# Patient Record
Sex: MALE | Race: WHITE | NOT HISPANIC OR LATINO
[De-identification: names, ages, dates, MRNs, and addresses within clinical notes are randomized per-mention and may not be internally consistent; named-entity substitution may affect disease eponyms.]

---

## 2022-02-03 PROBLEM — Z00.00 ENCOUNTER FOR PREVENTIVE HEALTH EXAMINATION: Status: ACTIVE | Noted: 2022-02-03

## 2022-02-11 ENCOUNTER — TRANSCRIPTION ENCOUNTER (OUTPATIENT)
Age: 27
End: 2022-02-11

## 2022-02-11 ENCOUNTER — APPOINTMENT (OUTPATIENT)
Dept: INTERNAL MEDICINE | Facility: CLINIC | Age: 27
End: 2022-02-11
Payer: MEDICAID

## 2022-02-11 ENCOUNTER — NON-APPOINTMENT (OUTPATIENT)
Age: 27
End: 2022-02-11

## 2022-02-11 VITALS
HEART RATE: 84 BPM | SYSTOLIC BLOOD PRESSURE: 130 MMHG | DIASTOLIC BLOOD PRESSURE: 87 MMHG | TEMPERATURE: 97.7 F | BODY MASS INDEX: 31.5 KG/M2 | OXYGEN SATURATION: 99 % | WEIGHT: 220 LBS | HEIGHT: 70 IN

## 2022-02-11 DIAGNOSIS — Z87.11 PERSONAL HISTORY OF PEPTIC ULCER DISEASE: ICD-10-CM

## 2022-02-11 DIAGNOSIS — J34.2 DEVIATED NASAL SEPTUM: ICD-10-CM

## 2022-02-11 DIAGNOSIS — E66.9 OBESITY, UNSPECIFIED: ICD-10-CM

## 2022-02-11 DIAGNOSIS — R30.0 DYSURIA: ICD-10-CM

## 2022-02-11 DIAGNOSIS — Z13.220 ENCOUNTER FOR SCREENING FOR LIPOID DISORDERS: ICD-10-CM

## 2022-02-11 DIAGNOSIS — R74.01 ELEVATION OF LEVELS OF LIVER TRANSAMINASE LEVELS: ICD-10-CM

## 2022-02-11 DIAGNOSIS — R35.0 FREQUENCY OF MICTURITION: ICD-10-CM

## 2022-02-11 DIAGNOSIS — N50.811 RIGHT TESTICULAR PAIN: ICD-10-CM

## 2022-02-11 DIAGNOSIS — Z87.898 PERSONAL HISTORY OF OTHER SPECIFIED CONDITIONS: ICD-10-CM

## 2022-02-11 DIAGNOSIS — J32.9 CHRONIC SINUSITIS, UNSPECIFIED: ICD-10-CM

## 2022-02-11 DIAGNOSIS — Z83.438 FAMILY HISTORY OF OTHER DISORDER OF LIPOPROTEIN METABOLISM AND OTHER LIPIDEMIA: ICD-10-CM

## 2022-02-11 PROCEDURE — 99385 PREV VISIT NEW AGE 18-39: CPT | Mod: 25

## 2022-02-11 PROCEDURE — 99213 OFFICE O/P EST LOW 20 MIN: CPT | Mod: GC,25

## 2022-02-11 NOTE — ASSESSMENT
[FreeTextEntry1] : 25 y/o with no PMH presenting to establish care but also with urinary symptoms. \par \par #Dysuria\par #Right testicular pain \par #r/o epididymides \par Few weeks of dysuria, urinary frequency and R testicular tenderness. No new sexual partners - recently negative for STI workup. On exam pt had mild epididymides tenderness. R testicular fullness without tenderness. No rashes or discharge noted. Per pt, he's always had R testicular fullness. No signs of hernia on exam.\par - U/A with culture and Urine Chlamydia/GC obtained\par - Consider abx treatment depending on studies \par \par #Hyperlipidemia \par History of hyperlipidemia per Patient. BMI >30 \par - Lipid panel obtained\par \par #Urinary frequency \par Plan as above. Will also obtain A1C to r/o diabetes in setting of polyuria. \par \par #Elevated ALT\par Reports he had an isolated elevated ALT at some point and was told to recheck in 6 months. \par - CMP obtained today \par \par #PUD \par History of ulcer disease - was told he had "mini ulcers" in 2020 after an Endoscopy. No symptoms at this time.\par - GI referral given due to change in insurance\par \par #Chronic Sinusitis \par - ENT referral given \par \par #HCM\par Recently finished Hep B series \par UTD with Tdap and MMR (will send records) \par HPV (got both doses when 14) \par COVID triple vaccinated\par Flu vaccinated

## 2022-02-11 NOTE — END OF VISIT
[] : Resident [FreeTextEntry3] :  26 year old presenting to [facility] care. Reports recent dysuria and urinary frequency. Some discomfort, swelling in R testicle, though notes R testicle is usually larger than L. Full STI testing was performed 11/2021 including GC/chlamydia, not sexually active since then. On exam. well appearing, pleasant, NAD. : normal appearing circumcised penis, no lesions or discharge. Mild R scrotal fullness compared to L. Mild tenderness to palpation R posterior testis, no obvious hernia w/ valsalva. no masses. \par -Dysuria/testicular discomfort. Possible epididymitis. Urine culture, GC/chlamydia. Pt defers empiric abx, prefers to await urine results. Of note, pt felt faint during exam, will defer blood work for another day.

## 2022-02-11 NOTE — PHYSICAL EXAM
[Normal Outer Ear/Nose] : the outer ears and nose were normal in appearance [Normal Oropharynx] : the oropharynx was normal [Normal TMs] : both tympanic membranes were normal [Urethral Meatus] : meatus normal [Penis Abnormality] : normal circumcised penis [Normal] : affect was normal and insight and judgment were intact [de-identified] : Deviated septum  [FreeTextEntry1] : Mild epididymides tenderness. R testicular fullness without tenderness. No rashes or discharge noted.

## 2022-02-11 NOTE — HISTORY OF PRESENT ILLNESS
[FreeTextEntry1] : Urinary symptoms  [de-identified] : 25 y/o with PMH of PUD, chronic sinusitis, presenting to establish care but also with urinary symptoms concerning for UTI. Symptoms started a few weeks ago and were more severe at that time. Now symptoms are intermittent and less severe but still present. Symptoms include burning sensation while urinating and increased frequency. Patient also reports R testicular pain which started around the same time. No penile discharge, ulcers, rashes or hematuria. No fevers/chills. No concern for STD - tested in November and negative. No new partners since then. No history of UTI. \par \par Oct 2020 Patient had an Endoscopy - found to have "mini ulcers". Unclear etiology - believes stress included. Was on Sucralfate for 6 months then Protonix 40 mg. Symptoms have resolved since then. Requesting new GI referral due to change in insurance.\par \par Social History: \par March 2020 - quit cigarettes. Previously on and off smoking between 5 cigarettes to 1 pack per day for 10 years. \par Minimal alcohol intake (2-3 times monthly) \par Denies illicit drug use \par Practices sex with men - currently not practicing. \par Occupation: Assistant

## 2022-02-14 LAB
APPEARANCE: CLEAR
BILIRUBIN URINE: NEGATIVE
BLOOD URINE: NEGATIVE
C TRACH RRNA SPEC QL NAA+PROBE: NOT DETECTED
COLOR: COLORLESS
GLUCOSE QUALITATIVE U: NEGATIVE
KETONES URINE: NEGATIVE
LEUKOCYTE ESTERASE URINE: NEGATIVE
N GONORRHOEA RRNA SPEC QL NAA+PROBE: NOT DETECTED
NITRITE URINE: NEGATIVE
PH URINE: 6.5
PROTEIN URINE: NEGATIVE
SOURCE AMPLIFICATION: NORMAL
SPECIFIC GRAVITY URINE: 1
UROBILINOGEN URINE: NORMAL

## 2022-04-24 ENCOUNTER — OUTPATIENT (OUTPATIENT)
Dept: OUTPATIENT SERVICES | Facility: HOSPITAL | Age: 27
LOS: 1 days | End: 2022-04-24
Payer: COMMERCIAL

## 2022-04-24 ENCOUNTER — APPOINTMENT (OUTPATIENT)
Dept: ULTRASOUND IMAGING | Facility: HOSPITAL | Age: 27
End: 2022-04-24

## 2022-04-24 PROCEDURE — 76870 US EXAM SCROTUM: CPT | Mod: 26

## 2022-04-24 PROCEDURE — 76870 US EXAM SCROTUM: CPT

## 2022-04-28 ENCOUNTER — NON-APPOINTMENT (OUTPATIENT)
Age: 27
End: 2022-04-28